# Patient Record
Sex: MALE
[De-identification: names, ages, dates, MRNs, and addresses within clinical notes are randomized per-mention and may not be internally consistent; named-entity substitution may affect disease eponyms.]

---

## 2022-02-05 ENCOUNTER — TRANSCRIPTION ENCOUNTER (OUTPATIENT)
Age: 32
End: 2022-02-05

## 2024-02-15 ENCOUNTER — NON-APPOINTMENT (OUTPATIENT)
Age: 34
End: 2024-02-15

## 2024-02-15 ENCOUNTER — APPOINTMENT (OUTPATIENT)
Dept: CARDIOLOGY | Facility: CLINIC | Age: 34
End: 2024-02-15
Payer: COMMERCIAL

## 2024-02-15 VITALS
DIASTOLIC BLOOD PRESSURE: 83 MMHG | SYSTOLIC BLOOD PRESSURE: 125 MMHG | WEIGHT: 190 LBS | HEART RATE: 93 BPM | HEIGHT: 73 IN | OXYGEN SATURATION: 98 % | BODY MASS INDEX: 25.18 KG/M2

## 2024-02-15 DIAGNOSIS — Z82.49 FAMILY HISTORY OF ISCHEMIC HEART DISEASE AND OTHER DISEASES OF THE CIRCULATORY SYSTEM: ICD-10-CM

## 2024-02-15 DIAGNOSIS — R07.89 OTHER CHEST PAIN: ICD-10-CM

## 2024-02-15 DIAGNOSIS — Z00.00 ENCOUNTER FOR GENERAL ADULT MEDICAL EXAMINATION W/OUT ABNORMAL FINDINGS: ICD-10-CM

## 2024-02-15 PROCEDURE — G2211 COMPLEX E/M VISIT ADD ON: CPT

## 2024-02-15 PROCEDURE — 93000 ELECTROCARDIOGRAM COMPLETE: CPT

## 2024-02-15 PROCEDURE — 99204 OFFICE O/P NEW MOD 45 MIN: CPT

## 2024-02-15 NOTE — DISCUSSION/SUMMARY
[FreeTextEntry1] : This is a 33 year old man with a strong family history of CAD as his father passed of an MI who presents for a cardiac evaluation.  He has a longstanding history of atypical chest pain.  I am referring him for an echocardiogram to assess His cardiac structure and function, as well as an exercise stress test to assess His hemodynamic response to exercise, and to assess for the presence of obstructive coronary artery disease.  Given his family history, he will get a calcium score, an NMR Lipids, and a full set of routine blood work.  We discussed the benefits of a healthy diet, regular exercise and physical activity, and weight loss in detail.  I will follow up with him regarding these results.  [EKG obtained to assist in diagnosis and management of assessed problem(s)] : EKG obtained to assist in diagnosis and management of assessed problem(s)

## 2024-02-15 NOTE — HISTORY OF PRESENT ILLNESS
[FreeTextEntry1] : This is a 33 year old man with a strong family history of CAD as his father passed of an MI who presents for a cardiac evaluation.  For 7 years he has had left sided chest pains that radiate to his left arm and middle two fingers.  The pain is not exertional, seems unrelated to food, and more present in times of intense stress.  No associated dyspnea, nausea, vomiting, diaphoresis.  He does not get it when he exercises, but does feel a tightness at times with exercise.  He has had EKGs in the past, and they have always been normal.  He was told that he should have a more comprehensive cardiac evaluation.

## 2024-03-26 ENCOUNTER — APPOINTMENT (OUTPATIENT)
Dept: CARDIOLOGY | Facility: CLINIC | Age: 34
End: 2024-03-26
Payer: COMMERCIAL

## 2024-03-26 PROCEDURE — 93015 CV STRESS TEST SUPVJ I&R: CPT

## 2024-03-26 PROCEDURE — 93306 TTE W/DOPPLER COMPLETE: CPT
